# Patient Record
Sex: MALE | Race: OTHER | Employment: OTHER | URBAN - METROPOLITAN AREA
[De-identification: names, ages, dates, MRNs, and addresses within clinical notes are randomized per-mention and may not be internally consistent; named-entity substitution may affect disease eponyms.]

---

## 2024-10-24 ENCOUNTER — HOSPITAL ENCOUNTER (OUTPATIENT)
Facility: HOSPITAL | Age: 69
Discharge: HOME | End: 2024-10-27

## 2024-10-24 ENCOUNTER — CLINICAL DOCUMENTATION (OUTPATIENT)
Facility: HOSPITAL | Age: 69
End: 2024-10-24

## 2024-10-24 VITALS
RESPIRATION RATE: 16 BRPM | DIASTOLIC BLOOD PRESSURE: 81 MMHG | WEIGHT: 185 LBS | HEIGHT: 71 IN | SYSTOLIC BLOOD PRESSURE: 139 MMHG | BODY MASS INDEX: 25.9 KG/M2 | HEART RATE: 66 BPM

## 2024-10-24 RX ORDER — METOPROLOL SUCCINATE 25 MG/1
TABLET, EXTENDED RELEASE ORAL
COMMUNITY

## 2024-10-24 RX ORDER — AMLODIPINE BESYLATE 2.5 MG/1
TABLET ORAL
COMMUNITY

## 2024-10-24 RX ORDER — ASPIRIN 81 MG/1
1 TABLET ORAL DAILY
COMMUNITY

## 2024-10-24 RX ORDER — LEVOTHYROXINE SODIUM 25 UG/1
TABLET ORAL
COMMUNITY

## 2024-10-24 RX ORDER — ATORVASTATIN CALCIUM 10 MG/1
TABLET, FILM COATED ORAL
COMMUNITY

## 2024-10-24 ASSESSMENT — PAIN SCALES - GENERAL: PAINLEVEL_OUTOF10: 0

## 2024-10-24 NOTE — CONSULTS
[facility] [place] [facility]  [facility]    Radiation Oncology Consultation    Matt Scales  [id number]  1955     Diagnosis     The patient is a 69 y.o. 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    Care Team:  Dr. Lindsey    [facility] Staging has been reviewed  History of Present Illness   Mr. Scales is a 69 y.o. male seen in consultation at the request of Dr. Lindsey to assess the role of radiation for his above diagnosis.    Oncologic History:  The patient has a past medical history of hypothyroidism and CAD, as well arthritis (s/p bilat knee surgeries and bilat hip surgeries)  His PCP had checked his PSA in 2023, was reportedly ~5 ng/mL. For this reason, MRI was ordered of the prostate  12/2023: Prostate MRI - Limited exam due to hip replacements, prostate volume 49cc. PIRADS2, no ESPERANZA or SVI. Not able to discern a distinct prostate lesion.  4/2024: PSA found to be elevated at 4.24 ng/mL (this was after he got plugged in with VU in [place])  Because of this, was recommended a prostate biopsy  8/28/24: Prostate biopsy - 35.38cc gland; Jaren GG2 (3+4=7) in 3/12 routine cores, Cribriform pattern present (2 cores) as well as PNI (1 core)    Last Colonoscopy - next due 2026  Hip implants - bilat hip implants    Mr. Scales presents today with his supportive wife to discuss radiotherapy treatment options for his prostate cancer. On interview today, reports overall he feels well. Denies bothersome GI or  issues. Reports that about 2-3 years ago, had issues with leakage and urgency, had tried Flomax but did not help. He and his wife adjusted their diet, and ever since he hasn't had bothersome  issues.     He is seeing a surgeon virtually from [facility] this upcoming weekend for a visit to discuss surgery as an option. Also

## 2024-10-24 NOTE — PROGRESS NOTES
NCCN Distress Thermometer    Date Screening Completed: 10/24/24    Screening Declined:  [] Yes    Number that best describes how much distress you've experienced in the past week, including today?  0 [x] - No distress 1 []      2 []      3 []      4 []       5 []       6 []      7 []      8 []      9 []       10 [] - Extreme distress    PROBLEM LIST  Have you had concerns about any of the items below in the past week, including today?      Physical Concerns Practical Concerns   [] Pain [] Taking care of myself    [] Sleep [] Taking care of others    [] Fatigue [] Work   [] Tobacco use  [] School   [] Substance use  [] Housing   [] Memory or concentration [] Finances   [] Sexual health [] Insurance   [] Changes in eating  [] Transportation   [] Loss or change of physical abilities  []     [] Having enough food   Emotional Concerns [] Access to medicine   [] Worry or anxiety [] Treatment decisions   [] Sadness or depression    [] Loss of interest or enjoyment  Spiritual or Taoist Concerns   [] Grief or loss  [] Sense of meaning or purpose   [] Fear [] Changes in zuleyka or beliefs   [] Loneliness  [] Death, dying, or afterlife   [] Anger [] Conflict between beliefs and cancer treatments    [] Changes in appearance [] Relationship with the sacred   [] Feelings of worthlessness or being a burden [] Ritual or dietary needs        Social Concerns     [] Relationship with spouse or partner     [] Relationship with children    [] Relationship with family members     [] Relationship with friends or coworkers     [] Communication with health care team     [] Ability to have children     [] Prejudice or discrimination        Other Concerns:     Patient received resource information and education:  [] Yes  [] No

## 2024-11-06 ENCOUNTER — CLINICAL DOCUMENTATION (OUTPATIENT)
Facility: HOSPITAL | Age: 69
End: 2024-11-06

## 2024-11-06 NOTE — PROGRESS NOTES
Mr. Scales called. He has decided to proceed with SBRT.    He will coordinate colonoscopy with his PCP. Thereafter, we will schedule SpaceOAR/Fiducials at [facility] followed by CT SIM for RT planning on the same day.     All of his questions were answered.

## 2024-12-16 RX ORDER — SULFAMETHOXAZOLE AND TRIMETHOPRIM 800; 160 MG/1; MG/1
TABLET ORAL
Qty: 1 TABLET | Refills: 0 | Status: SHIPPED | OUTPATIENT
Start: 2024-12-16

## 2024-12-19 ENCOUNTER — HOSPITAL ENCOUNTER (OUTPATIENT)
Facility: HOSPITAL | Age: 69
Discharge: HOME | End: 2024-12-22
Attending: EMERGENCY MEDICINE

## 2025-01-28 ENCOUNTER — HOSPITAL ENCOUNTER (OUTPATIENT)
Facility: HOSPITAL | Age: 70
Discharge: HOME | End: 2025-01-31
Attending: EMERGENCY MEDICINE

## 2025-01-28 LAB
RAD ONC ARIA COURSE FIRST TREATMENT DATE: NORMAL
RAD ONC ARIA COURSE ID: NORMAL
RAD ONC ARIA COURSE INTENT: NORMAL
RAD ONC ARIA COURSE LAST TREATMENT DATE: NORMAL
RAD ONC ARIA COURSE SESSION NUMBER: 1
RAD ONC ARIA COURSE START DATE: NORMAL
RAD ONC ARIA COURSE TREATMENT ELAPSED DAYS: 0
RAD ONC ARIA PLAN FRACTIONS TREATED TO DATE: 1
RAD ONC ARIA PLAN ID: NORMAL
RAD ONC ARIA PLAN PRESCRIBED DOSE PER FRACTION: 8 GY
RAD ONC ARIA PLAN PRIMARY REFERENCE POINT: NORMAL
RAD ONC ARIA PLAN TOTAL FRACTIONS PRESCRIBED: 5
RAD ONC ARIA PLAN TOTAL PRESCRIBED DOSE: 4000 CGY
RAD ONC ARIA REFERENCE POINT DOSAGE GIVEN TO DATE: 7.25 GY
RAD ONC ARIA REFERENCE POINT DOSAGE GIVEN TO DATE: 8 GY
RAD ONC ARIA REFERENCE POINT DOSAGE GIVEN TO DATE: 8.11 GY
RAD ONC ARIA REFERENCE POINT ID: NORMAL
RAD ONC ARIA REFERENCE POINT SESSION DOSAGE GIVEN: 7.25 GY
RAD ONC ARIA REFERENCE POINT SESSION DOSAGE GIVEN: 8 GY
RAD ONC ARIA REFERENCE POINT SESSION DOSAGE GIVEN: 8.11 GY

## 2025-01-30 ENCOUNTER — HOSPITAL ENCOUNTER (OUTPATIENT)
Facility: HOSPITAL | Age: 70
Discharge: HOME | End: 2025-02-02
Attending: EMERGENCY MEDICINE

## 2025-01-30 LAB
RAD ONC ARIA COURSE FIRST TREATMENT DATE: NORMAL
RAD ONC ARIA COURSE ID: NORMAL
RAD ONC ARIA COURSE INTENT: NORMAL
RAD ONC ARIA COURSE LAST TREATMENT DATE: NORMAL
RAD ONC ARIA COURSE SESSION NUMBER: 2
RAD ONC ARIA COURSE START DATE: NORMAL
RAD ONC ARIA COURSE TREATMENT ELAPSED DAYS: 2
RAD ONC ARIA PLAN FRACTIONS TREATED TO DATE: 2
RAD ONC ARIA PLAN ID: NORMAL
RAD ONC ARIA PLAN PRESCRIBED DOSE PER FRACTION: 8 GY
RAD ONC ARIA PLAN PRIMARY REFERENCE POINT: NORMAL
RAD ONC ARIA PLAN TOTAL FRACTIONS PRESCRIBED: 5
RAD ONC ARIA PLAN TOTAL PRESCRIBED DOSE: 4000 CGY
RAD ONC ARIA REFERENCE POINT DOSAGE GIVEN TO DATE: 14.5 GY
RAD ONC ARIA REFERENCE POINT DOSAGE GIVEN TO DATE: 16 GY
RAD ONC ARIA REFERENCE POINT DOSAGE GIVEN TO DATE: 16.21 GY
RAD ONC ARIA REFERENCE POINT ID: NORMAL
RAD ONC ARIA REFERENCE POINT SESSION DOSAGE GIVEN: 7.25 GY
RAD ONC ARIA REFERENCE POINT SESSION DOSAGE GIVEN: 8 GY
RAD ONC ARIA REFERENCE POINT SESSION DOSAGE GIVEN: 8.11 GY

## 2025-01-30 RX ORDER — TAMSULOSIN HYDROCHLORIDE 0.4 MG/1
0.4 CAPSULE ORAL DAILY
Qty: 60 CAPSULE | Refills: 1 | Status: SHIPPED | OUTPATIENT
Start: 2025-01-30

## 2025-01-30 ASSESSMENT — PATIENT HEALTH QUESTIONNAIRE - PHQ9
SUM OF ALL RESPONSES TO PHQ QUESTIONS 1-9: 0
SUM OF ALL RESPONSES TO PHQ9 QUESTIONS 1 & 2: 0
1. LITTLE INTEREST OR PLEASURE IN DOING THINGS: NOT AT ALL
2. FEELING DOWN, DEPRESSED OR HOPELESS: NOT AT ALL
SUM OF ALL RESPONSES TO PHQ QUESTIONS 1-9: 0

## 2025-01-30 ASSESSMENT — PAIN SCALES - GENERAL: PAINLEVEL_OUTOF10: 0

## 2025-01-30 NOTE — ON TREATMENT VISIT
1600 4000 1/28/25 ongoing     Response to treatment: N/A    Allergies and Medications   No Known Allergies    Current Outpatient Medications   Medication Instructions    amLODIPine (NORVASC) 2.5 MG tablet     aspirin 81 MG EC tablet 1 tablet, Oral, DAILY    atorvastatin (LIPITOR) 10 MG tablet Oral    levothyroxine (SYNTHROID) 25 MCG tablet Oral    metoprolol succinate (TOPROL XL) 25 MG extended release tablet     sulfamethoxazole-trimethoprim (BACTRIM DS;SEPTRA DS) 800-160 MG per tablet Bring with you on day of procedure to radiation oncology clinic, do not take until instructed by MD or RN        Assessment & Plan   - Continue radiation treatment as planned  - Treatment setup and plan reviewed. Port images/CBCT images reviewed. Appropriate laboratory work was reviewed.   - Treatment side effects and toxicities reviewed with the patient, and appropriate management was advised as detailed above.     Pam Avalos, DO  [facility]  [facility]  [id number] [place] [id number]  P: [phone]  [facility] [address], [place] [address]  P: [phone]  [facility]  [address] [place] [address], [address], [place] [address]  P: [phone]

## 2025-02-03 ENCOUNTER — HOSPITAL ENCOUNTER (OUTPATIENT)
Facility: HOSPITAL | Age: 70
Discharge: HOME | End: 2025-02-06
Attending: EMERGENCY MEDICINE

## 2025-02-03 LAB
RAD ONC ARIA COURSE FIRST TREATMENT DATE: NORMAL
RAD ONC ARIA COURSE ID: NORMAL
RAD ONC ARIA COURSE INTENT: NORMAL
RAD ONC ARIA COURSE LAST TREATMENT DATE: NORMAL
RAD ONC ARIA COURSE SESSION NUMBER: 3
RAD ONC ARIA COURSE START DATE: NORMAL
RAD ONC ARIA COURSE TREATMENT ELAPSED DAYS: 6
RAD ONC ARIA PLAN FRACTIONS TREATED TO DATE: 3
RAD ONC ARIA PLAN ID: NORMAL
RAD ONC ARIA PLAN PRESCRIBED DOSE PER FRACTION: 8 GY
RAD ONC ARIA PLAN PRIMARY REFERENCE POINT: NORMAL
RAD ONC ARIA PLAN TOTAL FRACTIONS PRESCRIBED: 5
RAD ONC ARIA PLAN TOTAL PRESCRIBED DOSE: 4000 CGY
RAD ONC ARIA REFERENCE POINT DOSAGE GIVEN TO DATE: 21.75 GY
RAD ONC ARIA REFERENCE POINT DOSAGE GIVEN TO DATE: 24 GY
RAD ONC ARIA REFERENCE POINT DOSAGE GIVEN TO DATE: 24.32 GY
RAD ONC ARIA REFERENCE POINT ID: NORMAL
RAD ONC ARIA REFERENCE POINT SESSION DOSAGE GIVEN: 7.25 GY
RAD ONC ARIA REFERENCE POINT SESSION DOSAGE GIVEN: 8 GY
RAD ONC ARIA REFERENCE POINT SESSION DOSAGE GIVEN: 8.11 GY

## 2025-02-05 ENCOUNTER — HOSPITAL ENCOUNTER (OUTPATIENT)
Facility: HOSPITAL | Age: 70
Discharge: HOME | End: 2025-02-08
Attending: EMERGENCY MEDICINE

## 2025-02-05 LAB
RAD ONC ARIA COURSE FIRST TREATMENT DATE: NORMAL
RAD ONC ARIA COURSE ID: NORMAL
RAD ONC ARIA COURSE INTENT: NORMAL
RAD ONC ARIA COURSE LAST TREATMENT DATE: NORMAL
RAD ONC ARIA COURSE SESSION NUMBER: 4
RAD ONC ARIA COURSE START DATE: NORMAL
RAD ONC ARIA COURSE TREATMENT ELAPSED DAYS: 8
RAD ONC ARIA PLAN FRACTIONS TREATED TO DATE: 4
RAD ONC ARIA PLAN ID: NORMAL
RAD ONC ARIA PLAN PRESCRIBED DOSE PER FRACTION: 8 GY
RAD ONC ARIA PLAN PRIMARY REFERENCE POINT: NORMAL
RAD ONC ARIA PLAN TOTAL FRACTIONS PRESCRIBED: 5
RAD ONC ARIA PLAN TOTAL PRESCRIBED DOSE: 4000 CGY
RAD ONC ARIA REFERENCE POINT DOSAGE GIVEN TO DATE: 29 GY
RAD ONC ARIA REFERENCE POINT DOSAGE GIVEN TO DATE: 32 GY
RAD ONC ARIA REFERENCE POINT DOSAGE GIVEN TO DATE: 32.42 GY
RAD ONC ARIA REFERENCE POINT ID: NORMAL
RAD ONC ARIA REFERENCE POINT SESSION DOSAGE GIVEN: 7.25 GY
RAD ONC ARIA REFERENCE POINT SESSION DOSAGE GIVEN: 8 GY
RAD ONC ARIA REFERENCE POINT SESSION DOSAGE GIVEN: 8.11 GY

## 2025-02-07 ENCOUNTER — HOSPITAL ENCOUNTER (OUTPATIENT)
Facility: HOSPITAL | Age: 70
Discharge: HOME | End: 2025-02-10
Attending: EMERGENCY MEDICINE

## 2025-02-07 LAB
RAD ONC ARIA COURSE FIRST TREATMENT DATE: NORMAL
RAD ONC ARIA COURSE ID: NORMAL
RAD ONC ARIA COURSE INTENT: NORMAL
RAD ONC ARIA COURSE LAST TREATMENT DATE: NORMAL
RAD ONC ARIA COURSE SESSION NUMBER: 5
RAD ONC ARIA COURSE START DATE: NORMAL
RAD ONC ARIA COURSE TREATMENT ELAPSED DAYS: 10
RAD ONC ARIA PLAN FRACTIONS TREATED TO DATE: 5
RAD ONC ARIA PLAN ID: NORMAL
RAD ONC ARIA PLAN PRESCRIBED DOSE PER FRACTION: 8 GY
RAD ONC ARIA PLAN PRIMARY REFERENCE POINT: NORMAL
RAD ONC ARIA PLAN TOTAL FRACTIONS PRESCRIBED: 5
RAD ONC ARIA PLAN TOTAL PRESCRIBED DOSE: 4000 CGY
RAD ONC ARIA REFERENCE POINT DOSAGE GIVEN TO DATE: 36.25 GY
RAD ONC ARIA REFERENCE POINT DOSAGE GIVEN TO DATE: 40 GY
RAD ONC ARIA REFERENCE POINT DOSAGE GIVEN TO DATE: 40.53 GY
RAD ONC ARIA REFERENCE POINT ID: NORMAL
RAD ONC ARIA REFERENCE POINT SESSION DOSAGE GIVEN: 7.25 GY
RAD ONC ARIA REFERENCE POINT SESSION DOSAGE GIVEN: 8 GY
RAD ONC ARIA REFERENCE POINT SESSION DOSAGE GIVEN: 8.11 GY

## 2025-02-07 NOTE — PROGRESS NOTES
[facility] [place] [facility]  [facility]    Radiation Oncology End of Treatment Summary    Matt Scales  [id number]  1955     Care Team:  Dr Lindsey    Diagnosis   C61     The patient is a 69 y.o. male with newly diagnosed favorable intermediate risk prostate cancer ([id number], iPSA 4.24, Jaren GG2 in 3/12 cores (+Cribriform pattern, +PNI). 34cc Gland.      [facility] Staging has been reviewed.  Oncologic History     The patient has a past medical history of hypothyroidism and CAD, as well arthritis (s/p bilat knee surgeries and bilat hip surgeries)  His PCP had checked his PSA in 2023, was reportedly ~5 ng/mL. For this reason, MRI was ordered of the prostate  12/2023: Prostate MRI - Limited exam due to hip replacements, prostate volume 49cc. PIRADS2, no ESPERANZA or SVI. Not able to discern a distinct prostate lesion.  4/2024: PSA found to be elevated at 4.24 ng/mL (this was after he got plugged in with VU in [place])  Because of this, was recommended a prostate biopsy  8/28/24: Prostate biopsy - 35.38cc gland; Jaren GG2 (3+4=7) in 3/12 routine cores, Cribriform pattern present (2 cores) as well as PNI (1 core)  12/18/24: screening colonoscopy - diverticulosis, no specimens     After weighing his treatment options, he opted for definitive SBRT 5 Fx  12/19/24: SpaceOAR/Fiducial placement    Treatment Details:     Treating Center: [facility] [place] [facility]    Treatment Details:   Treatment Site Dose/Fx (cGy) #Fx Delivered Dose (cGy) Start Date End Date Elapsed Days   Prostate 800 5/5 4000 1/28/2025 2/7/2025 10     Concurrent Therapy: No concurrent systemic therapy    Radiation Treatments       Active   Reference Points   [id number]   Most recent treatment: Dose given: 800 cGy (on 2/7/2025)   Total: Dose given: 4,000 cGy   Elapsed Days: 10      [id number]   Most recent treatment: Dose given: 725 cGy (on 2/7/2025)   Total: Dose given: 3,625

## 2025-05-01 ENCOUNTER — HOSPITAL ENCOUNTER (OUTPATIENT)
Facility: HOSPITAL | Age: 70
Discharge: HOME | End: 2025-05-04
Attending: EMERGENCY MEDICINE

## 2025-05-01 ENCOUNTER — CLINICAL DOCUMENTATION (OUTPATIENT)
Facility: HOSPITAL | Age: 70
End: 2025-05-01

## 2025-05-01 VITALS — HEIGHT: 71 IN | BODY MASS INDEX: 26.04 KG/M2 | WEIGHT: 186 LBS

## 2025-05-01 DIAGNOSIS — C61 MALIGNANT NEOPLASM OF PROSTATE (HCC): Primary | ICD-10-CM

## 2025-05-01 RX ORDER — MULTIVITAMIN
1 TABLET ORAL DAILY
COMMUNITY

## 2025-05-01 ASSESSMENT — PAIN SCALES - GENERAL: PAINLEVEL_OUTOF10: 0

## 2025-05-01 NOTE — PROGRESS NOTES
[facility] [place] at [facility]  [facility]    Radiation Oncology Follow Up Note (VIRTUAL)  Date of Encounter: 05/01/25    Matt Scales  [id number]  1955     Care Team:  Dr. Lindsey    Diagnosis   C61    Mr. Scales is a 69 y.o. male with favorable intermediate risk prostate cancer ([id number], iPSA 4.24, [place] GG2 in 3/12 cores (+Cribriform pattern, +PNI). 34cc Gland. S/p definitive SBRT 40 Gy in 5 Fx EOT 2/7/2025.    [facility] Staging has been reviewed  History of Present Illness   Mr. Scales is a 69 y.o. male seen in routine post treatment follow up for his above diagnosis.      Oncologic History:  The patient has a past medical history of hypothyroidism, CAD, as well arthritis (s/p bilat knee surgeries and bilat hip surgeries)  His PCP had checked his PSA in 2023, was reportedly ~5 ng/mL. For this reason, MRI was ordered of the prostate  12/2023: Prostate MRI - Limited exam due to hip replacements, prostate volume 49cc. PIRADS 2, no ESPERANZA or SVI. Not able to discern a distinct prostate lesion.  4/2024: PSA found to be elevated at 4.24 ng/mL (this was after he got plugged in with VU in [place])  Because of this, was recommended a prostate biopsy  8/28/24: Prostate biopsy - 35.38cc gland; Jaren GG2 (3+4=7) in 3/12 routine cores, Cribriform pattern present (2 cores) as well as PNI (1 core)  12/18/24: screening colonoscopy - diverticulosis, no specimens  1/28/25 - 2/7/25: Prostate SBRT, 40 Gy in 5 Fx. Tolerated well overall. He did have SpaceOAR/Fiducials placed prior.  4/19/25: PSA 2.6 ng/mL      Interval History:   presents today virtually with his supportive wife in routine post treatment follow up. He completed definitive prostate SBRT approximately 3mo ago (2/7/25)  On interview today, he reports that overall things have been going well so far.  Reports ongoing nocturia x1-2 at night. Minimal daytime frequency/weak stream (weak stream worse at night time).

## 2025-05-01 NOTE — PROGRESS NOTES
[facility] [place]     Radiation Oncology Telemedicine Consent    2025 9:34 AM  Matt Scales is a 69 y.o. male  : 1955  MRN: [id number]    RADIATION ONCOLOGIST: Pam Avalos,       Matt Scales is a 69 y.o. male, was evaluated through a synchronous (real-time) audio-video encounter.  The patient (or guardian if applicable) is aware that this is a billable service, which includes applicable co-pays.  This Virtual Visit was conducted with patient's (and/or legal guardian's) consent.  The visit was conducted pursuant to the emergency declaration under the [place] Act and the National Emergencies Act, 1135 waiver authority and the Coronavirus Preparedness and Response Supplemental Appropriations Act.  Patient Identification was verified, and a caregiver was present when appropriate.  The patient was located in a state where the provider was licensed to provide care.      Patient Consent: We may discuss your diagnosis, treatment options, and prescriptions.  You understand we will be submitting a claim to your insurance company for payment.  Can you confirm that you consent to this appointment via Telemedicine? Yes     Reason For The Call: Follow-Up    Last Visit Date: